# Patient Record
Sex: FEMALE | Race: WHITE | NOT HISPANIC OR LATINO | Employment: OTHER | URBAN - METROPOLITAN AREA
[De-identification: names, ages, dates, MRNs, and addresses within clinical notes are randomized per-mention and may not be internally consistent; named-entity substitution may affect disease eponyms.]

---

## 2017-08-04 PROBLEM — I20.9 ANGINA, CLASS II: Status: ACTIVE | Noted: 2017-08-04

## 2021-02-02 PROBLEM — I10 ESSENTIAL HYPERTENSION: Status: ACTIVE | Noted: 2021-02-02

## 2021-02-02 PROBLEM — Z72.0 TOBACCO ABUSE: Status: ACTIVE | Noted: 2021-02-02

## 2021-03-30 ENCOUNTER — OFFICE VISIT (OUTPATIENT)
Dept: OBSTETRICS AND GYNECOLOGY | Facility: CLINIC | Age: 46
End: 2021-03-30
Payer: MEDICAID

## 2021-03-30 ENCOUNTER — LAB VISIT (OUTPATIENT)
Dept: LAB | Facility: HOSPITAL | Age: 46
End: 2021-03-30
Attending: OBSTETRICS & GYNECOLOGY
Payer: MEDICAID

## 2021-03-30 VITALS
RESPIRATION RATE: 13 BRPM | HEART RATE: 72 BPM | DIASTOLIC BLOOD PRESSURE: 67 MMHG | BODY MASS INDEX: 35.3 KG/M2 | SYSTOLIC BLOOD PRESSURE: 102 MMHG | WEIGHT: 193 LBS

## 2021-03-30 DIAGNOSIS — N83.202 LEFT OVARIAN CYST: Primary | ICD-10-CM

## 2021-03-30 DIAGNOSIS — N83.202 LEFT OVARIAN CYST: ICD-10-CM

## 2021-03-30 PROCEDURE — 99203 PR OFFICE/OUTPT VISIT, NEW, LEVL III, 30-44 MIN: ICD-10-PCS | Mod: S$PBB,,, | Performed by: OBSTETRICS & GYNECOLOGY

## 2021-03-30 PROCEDURE — 99203 OFFICE O/P NEW LOW 30 MIN: CPT | Mod: S$PBB,,, | Performed by: OBSTETRICS & GYNECOLOGY

## 2021-03-30 PROCEDURE — 99205 OFFICE O/P NEW HI 60 MIN: CPT | Mod: PBBFAC | Performed by: OBSTETRICS & GYNECOLOGY

## 2021-03-30 PROCEDURE — 86304 IMMUNOASSAY TUMOR CA 125: CPT | Performed by: OBSTETRICS & GYNECOLOGY

## 2021-03-30 PROCEDURE — 99999 PR PBB SHADOW E&M-NEW PATIENT-LVL V: ICD-10-PCS | Mod: PBBFAC,,, | Performed by: OBSTETRICS & GYNECOLOGY

## 2021-03-30 PROCEDURE — 36415 COLL VENOUS BLD VENIPUNCTURE: CPT | Performed by: OBSTETRICS & GYNECOLOGY

## 2021-03-30 PROCEDURE — 99999 PR PBB SHADOW E&M-NEW PATIENT-LVL V: CPT | Mod: PBBFAC,,, | Performed by: OBSTETRICS & GYNECOLOGY

## 2021-03-30 RX ORDER — CIPROFLOXACIN 500 MG/1
500 TABLET ORAL
COMMUNITY
End: 2021-04-22

## 2021-03-30 RX ORDER — PRAVASTATIN SODIUM 40 MG/1
40 TABLET ORAL DAILY
COMMUNITY
End: 2022-04-13

## 2021-03-30 RX ORDER — METRONIDAZOLE 500 MG/1
500 TABLET ORAL 3 TIMES DAILY
COMMUNITY
End: 2021-04-22

## 2021-03-30 RX ORDER — CYCLOBENZAPRINE HCL 10 MG
10 TABLET ORAL 3 TIMES DAILY PRN
COMMUNITY
End: 2022-08-01

## 2021-03-31 LAB — CANCER AG125 SERPL-ACNC: 7 U/ML (ref 0–30)

## 2021-04-01 ENCOUNTER — TELEPHONE (OUTPATIENT)
Dept: OBSTETRICS AND GYNECOLOGY | Facility: CLINIC | Age: 46
End: 2021-04-01

## 2021-04-19 ENCOUNTER — ANESTHESIA EVENT (OUTPATIENT)
Dept: SURGERY | Facility: HOSPITAL | Age: 46
End: 2021-04-19
Payer: MEDICAID

## 2021-04-22 ENCOUNTER — OFFICE VISIT (OUTPATIENT)
Dept: OBSTETRICS AND GYNECOLOGY | Facility: CLINIC | Age: 46
End: 2021-04-22
Payer: MEDICAID

## 2021-04-22 ENCOUNTER — HOSPITAL ENCOUNTER (OUTPATIENT)
Dept: PREADMISSION TESTING | Facility: HOSPITAL | Age: 46
Discharge: HOME | End: 2021-04-22
Attending: OBSTETRICS & GYNECOLOGY
Payer: MEDICAID

## 2021-04-22 ENCOUNTER — HOSPITAL ENCOUNTER (OUTPATIENT)
Dept: PULMONOLOGY | Facility: HOSPITAL | Age: 46
Discharge: HOME | End: 2021-04-22
Attending: OBSTETRICS & GYNECOLOGY
Payer: MEDICAID

## 2021-04-22 ENCOUNTER — HOSPITAL ENCOUNTER (OUTPATIENT)
Dept: RADIOLOGY | Facility: HOSPITAL | Age: 46
Discharge: HOME | End: 2021-04-22
Attending: OBSTETRICS & GYNECOLOGY
Payer: MEDICAID

## 2021-04-22 VITALS
SYSTOLIC BLOOD PRESSURE: 112 MMHG | WEIGHT: 190.38 LBS | HEART RATE: 86 BPM | BODY MASS INDEX: 35.03 KG/M2 | DIASTOLIC BLOOD PRESSURE: 70 MMHG | RESPIRATION RATE: 12 BRPM | OXYGEN SATURATION: 100 % | HEIGHT: 62 IN

## 2021-04-22 DIAGNOSIS — N83.202 LEFT OVARIAN CYST: ICD-10-CM

## 2021-04-22 DIAGNOSIS — Z01.818 PREOP TESTING: ICD-10-CM

## 2021-04-22 DIAGNOSIS — N83.202 LEFT OVARIAN CYST: Primary | ICD-10-CM

## 2021-04-22 PROCEDURE — 93010 EKG 12-LEAD: ICD-10-PCS | Mod: ,,, | Performed by: INTERNAL MEDICINE

## 2021-04-22 PROCEDURE — 71045 X-RAY EXAM CHEST 1 VIEW: CPT | Mod: TC

## 2021-04-22 PROCEDURE — 71045 XR CHEST 1 VIEW PRE-OP: ICD-10-PCS | Mod: 26,,, | Performed by: RADIOLOGY

## 2021-04-22 PROCEDURE — 93005 ELECTROCARDIOGRAM TRACING: CPT

## 2021-04-22 PROCEDURE — 71045 X-RAY EXAM CHEST 1 VIEW: CPT | Mod: 26,,, | Performed by: RADIOLOGY

## 2021-04-22 PROCEDURE — 99999 PR PBB SHADOW E&M-EST. PATIENT-LVL V: CPT | Mod: PBBFAC,,, | Performed by: OBSTETRICS & GYNECOLOGY

## 2021-04-22 PROCEDURE — 99215 OFFICE O/P EST HI 40 MIN: CPT | Mod: PBBFAC,25 | Performed by: OBSTETRICS & GYNECOLOGY

## 2021-04-22 PROCEDURE — 93010 ELECTROCARDIOGRAM REPORT: CPT | Mod: ,,, | Performed by: INTERNAL MEDICINE

## 2021-04-22 PROCEDURE — 99499 NO LOS: ICD-10-PCS | Mod: S$PBB,,, | Performed by: OBSTETRICS & GYNECOLOGY

## 2021-04-22 PROCEDURE — 99499 UNLISTED E&M SERVICE: CPT | Mod: S$PBB,,, | Performed by: OBSTETRICS & GYNECOLOGY

## 2021-04-22 PROCEDURE — 99999 PR PBB SHADOW E&M-EST. PATIENT-LVL V: ICD-10-PCS | Mod: PBBFAC,,, | Performed by: OBSTETRICS & GYNECOLOGY

## 2021-04-22 RX ORDER — SODIUM CHLORIDE, SODIUM LACTATE, POTASSIUM CHLORIDE, CALCIUM CHLORIDE 600; 310; 30; 20 MG/100ML; MG/100ML; MG/100ML; MG/100ML
INJECTION, SOLUTION INTRAVENOUS CONTINUOUS
Status: CANCELLED | OUTPATIENT
Start: 2021-04-22

## 2021-04-24 ENCOUNTER — HOSPITAL ENCOUNTER (OUTPATIENT)
Dept: PREADMISSION TESTING | Facility: HOSPITAL | Age: 46
Discharge: HOME | End: 2021-04-24
Attending: OBSTETRICS & GYNECOLOGY
Payer: MEDICAID

## 2021-04-24 DIAGNOSIS — Z01.818 PRE-OP TESTING: ICD-10-CM

## 2021-04-24 LAB — SARS-COV-2 RNA RESP QL NAA+PROBE: NOT DETECTED

## 2021-04-24 PROCEDURE — U0005 INFEC AGEN DETEC AMPLI PROBE: HCPCS | Performed by: OBSTETRICS & GYNECOLOGY

## 2021-04-24 PROCEDURE — U0003 INFECTIOUS AGENT DETECTION BY NUCLEIC ACID (DNA OR RNA); SEVERE ACUTE RESPIRATORY SYNDROME CORONAVIRUS 2 (SARS-COV-2) (CORONAVIRUS DISEASE [COVID-19]), AMPLIFIED PROBE TECHNIQUE, MAKING USE OF HIGH THROUGHPUT TECHNOLOGIES AS DESCRIBED BY CMS-2020-01-R: HCPCS | Performed by: OBSTETRICS & GYNECOLOGY

## 2021-04-27 ENCOUNTER — ANESTHESIA (OUTPATIENT)
Dept: SURGERY | Facility: HOSPITAL | Age: 46
End: 2021-04-27
Payer: MEDICAID

## 2021-04-27 ENCOUNTER — HOSPITAL ENCOUNTER (OUTPATIENT)
Facility: HOSPITAL | Age: 46
Discharge: HOME | End: 2021-04-27
Attending: OBSTETRICS & GYNECOLOGY | Admitting: OBSTETRICS & GYNECOLOGY
Payer: MEDICAID

## 2021-04-27 VITALS
DIASTOLIC BLOOD PRESSURE: 65 MMHG | OXYGEN SATURATION: 100 % | SYSTOLIC BLOOD PRESSURE: 113 MMHG | TEMPERATURE: 97 F | RESPIRATION RATE: 18 BRPM | HEART RATE: 76 BPM

## 2021-04-27 DIAGNOSIS — N83.202 LEFT OVARIAN CYST: ICD-10-CM

## 2021-04-27 PROCEDURE — 37000009 HC ANESTHESIA EA ADD 15 MINS: Performed by: OBSTETRICS & GYNECOLOGY

## 2021-04-27 PROCEDURE — 27201423 OPTIME MED/SURG SUP & DEVICES STERILE SUPPLY: Performed by: OBSTETRICS & GYNECOLOGY

## 2021-04-27 PROCEDURE — 88307 TISSUE EXAM BY PATHOLOGIST: CPT | Mod: 26,,, | Performed by: PATHOLOGY

## 2021-04-27 PROCEDURE — 36000709 HC OR TIME LEV III EA ADD 15 MIN: Performed by: OBSTETRICS & GYNECOLOGY

## 2021-04-27 PROCEDURE — 88307 PR  SURG PATH,LEVEL V: ICD-10-PCS | Mod: 26,,, | Performed by: PATHOLOGY

## 2021-04-27 PROCEDURE — 71000033 HC RECOVERY, INTIAL HOUR: Performed by: OBSTETRICS & GYNECOLOGY

## 2021-04-27 PROCEDURE — 36000708 HC OR TIME LEV III 1ST 15 MIN: Performed by: OBSTETRICS & GYNECOLOGY

## 2021-04-27 PROCEDURE — 58661 PR LAP,RMV  ADNEXAL STRUCTURE: ICD-10-PCS | Mod: LT,,, | Performed by: OBSTETRICS & GYNECOLOGY

## 2021-04-27 PROCEDURE — 63600175 PHARM REV CODE 636 W HCPCS: Performed by: NURSE ANESTHETIST, CERTIFIED REGISTERED

## 2021-04-27 PROCEDURE — 88307 TISSUE EXAM BY PATHOLOGIST: CPT | Performed by: PATHOLOGY

## 2021-04-27 PROCEDURE — 00840 ANES IPER PX LOWER ABD NOS: CPT | Performed by: OBSTETRICS & GYNECOLOGY

## 2021-04-27 PROCEDURE — 25000003 PHARM REV CODE 250: Performed by: NURSE ANESTHETIST, CERTIFIED REGISTERED

## 2021-04-27 PROCEDURE — 58661 LAPAROSCOPY REMOVE ADNEXA: CPT | Mod: LT,,, | Performed by: OBSTETRICS & GYNECOLOGY

## 2021-04-27 PROCEDURE — 37000008 HC ANESTHESIA 1ST 15 MINUTES: Performed by: OBSTETRICS & GYNECOLOGY

## 2021-04-27 PROCEDURE — 58661 LAPAROSCOPY REMOVE ADNEXA: CPT | Mod: 80,LT,, | Performed by: OBSTETRICS & GYNECOLOGY

## 2021-04-27 PROCEDURE — 63600175 PHARM REV CODE 636 W HCPCS: Performed by: OBSTETRICS & GYNECOLOGY

## 2021-04-27 PROCEDURE — 58661 PR LAP,RMV  ADNEXAL STRUCTURE: ICD-10-PCS | Mod: 80,LT,, | Performed by: OBSTETRICS & GYNECOLOGY

## 2021-04-27 PROCEDURE — 00840 ANES IPER PX LOWER ABD NOS: CPT | Mod: QZ | Performed by: NURSE ANESTHETIST, CERTIFIED REGISTERED

## 2021-04-27 RX ORDER — SODIUM CHLORIDE 0.9 % (FLUSH) 0.9 %
3 SYRINGE (ML) INJECTION
Status: DISCONTINUED | OUTPATIENT
Start: 2021-04-27 | End: 2021-04-27 | Stop reason: HOSPADM

## 2021-04-27 RX ORDER — ACETAMINOPHEN 10 MG/ML
INJECTION, SOLUTION INTRAVENOUS
Status: DISCONTINUED | OUTPATIENT
Start: 2021-04-27 | End: 2021-04-27

## 2021-04-27 RX ORDER — ONDANSETRON HYDROCHLORIDE 2 MG/ML
INJECTION, SOLUTION INTRAMUSCULAR; INTRAVENOUS
Status: DISCONTINUED | OUTPATIENT
Start: 2021-04-27 | End: 2021-04-27

## 2021-04-27 RX ORDER — SODIUM CHLORIDE, SODIUM LACTATE, POTASSIUM CHLORIDE, CALCIUM CHLORIDE 600; 310; 30; 20 MG/100ML; MG/100ML; MG/100ML; MG/100ML
INJECTION, SOLUTION INTRAVENOUS CONTINUOUS
Status: DISCONTINUED | OUTPATIENT
Start: 2021-04-27 | End: 2021-04-27 | Stop reason: HOSPADM

## 2021-04-27 RX ORDER — MIDAZOLAM HYDROCHLORIDE 1 MG/ML
INJECTION INTRAMUSCULAR; INTRAVENOUS
Status: DISCONTINUED | OUTPATIENT
Start: 2021-04-27 | End: 2021-04-27

## 2021-04-27 RX ORDER — FENTANYL CITRATE 50 UG/ML
INJECTION, SOLUTION INTRAMUSCULAR; INTRAVENOUS
Status: DISCONTINUED | OUTPATIENT
Start: 2021-04-27 | End: 2021-04-27

## 2021-04-27 RX ORDER — KETOROLAC TROMETHAMINE 30 MG/ML
INJECTION, SOLUTION INTRAMUSCULAR; INTRAVENOUS
Status: DISCONTINUED | OUTPATIENT
Start: 2021-04-27 | End: 2021-04-27

## 2021-04-27 RX ORDER — NEOSTIGMINE METHYLSULFATE 5 MG/5 ML
SYRINGE (ML) INTRAVENOUS
Status: DISCONTINUED | OUTPATIENT
Start: 2021-04-27 | End: 2021-04-27

## 2021-04-27 RX ORDER — ROCURONIUM BROMIDE 10 MG/ML
INJECTION, SOLUTION INTRAVENOUS
Status: DISCONTINUED | OUTPATIENT
Start: 2021-04-27 | End: 2021-04-27

## 2021-04-27 RX ORDER — ETOMIDATE 2 MG/ML
INJECTION INTRAVENOUS
Status: DISCONTINUED | OUTPATIENT
Start: 2021-04-27 | End: 2021-04-27

## 2021-04-27 RX ORDER — OXYCODONE AND ACETAMINOPHEN 5; 325 MG/1; MG/1
1 TABLET ORAL EVERY 4 HOURS PRN
Qty: 20 TABLET | Refills: 0 | Status: SHIPPED | OUTPATIENT
Start: 2021-04-27 | End: 2021-05-11

## 2021-04-27 RX ORDER — DIPHENHYDRAMINE HYDROCHLORIDE 50 MG/ML
25 INJECTION INTRAMUSCULAR; INTRAVENOUS EVERY 6 HOURS PRN
Status: CANCELLED | OUTPATIENT
Start: 2021-04-27

## 2021-04-27 RX ORDER — SODIUM CHLORIDE 0.9 % (FLUSH) 0.9 %
3 SYRINGE (ML) INJECTION EVERY 8 HOURS
Status: DISCONTINUED | OUTPATIENT
Start: 2021-04-27 | End: 2021-04-27 | Stop reason: HOSPADM

## 2021-04-27 RX ORDER — KETOROLAC TROMETHAMINE 15 MG/ML
15 INJECTION, SOLUTION INTRAMUSCULAR; INTRAVENOUS EVERY 8 HOURS PRN
Status: DISCONTINUED | OUTPATIENT
Start: 2021-04-28 | End: 2021-04-27 | Stop reason: HOSPADM

## 2021-04-27 RX ORDER — LIDOCAINE HYDROCHLORIDE 20 MG/ML
INJECTION, SOLUTION EPIDURAL; INFILTRATION; INTRACAUDAL; PERINEURAL
Status: DISCONTINUED | OUTPATIENT
Start: 2021-04-27 | End: 2021-04-27

## 2021-04-27 RX ORDER — OXYCODONE AND ACETAMINOPHEN 5; 325 MG/1; MG/1
1 TABLET ORAL EVERY 4 HOURS PRN
Status: DISCONTINUED | OUTPATIENT
Start: 2021-04-27 | End: 2021-04-27 | Stop reason: HOSPADM

## 2021-04-27 RX ORDER — ONDANSETRON 2 MG/ML
4 INJECTION INTRAMUSCULAR; INTRAVENOUS DAILY PRN
Status: DISCONTINUED | OUTPATIENT
Start: 2021-04-27 | End: 2021-04-27 | Stop reason: HOSPADM

## 2021-04-27 RX ORDER — PROPOFOL 10 MG/ML
VIAL (ML) INTRAVENOUS
Status: DISCONTINUED | OUTPATIENT
Start: 2021-04-27 | End: 2021-04-27

## 2021-04-27 RX ADMIN — PROPOFOL 100 MG: 10 INJECTION, EMULSION INTRAVENOUS at 03:04

## 2021-04-27 RX ADMIN — GLYCOPYRROLATE 0.4 MG: 0.2 INJECTION, SOLUTION INTRAMUSCULAR; INTRAVENOUS at 04:04

## 2021-04-27 RX ADMIN — FENTANYL CITRATE 50 MCG: 50 INJECTION, SOLUTION INTRAMUSCULAR; INTRAVENOUS at 03:04

## 2021-04-27 RX ADMIN — FENTANYL CITRATE 100 MCG: 50 INJECTION, SOLUTION INTRAMUSCULAR; INTRAVENOUS at 03:04

## 2021-04-27 RX ADMIN — MIDAZOLAM HYDROCHLORIDE 2 MG: 1 INJECTION, SOLUTION INTRAMUSCULAR; INTRAVENOUS at 03:04

## 2021-04-27 RX ADMIN — LIDOCAINE HYDROCHLORIDE 80 MG: 20 INJECTION, SOLUTION EPIDURAL; INFILTRATION; INTRACAUDAL; PERINEURAL at 03:04

## 2021-04-27 RX ADMIN — ACETAMINOPHEN 1000 MG: 10 INJECTION, SOLUTION INTRAVENOUS at 03:04

## 2021-04-27 RX ADMIN — SODIUM CHLORIDE, SODIUM LACTATE, POTASSIUM CHLORIDE, AND CALCIUM CHLORIDE: .6; .31; .03; .02 INJECTION, SOLUTION INTRAVENOUS at 03:04

## 2021-04-27 RX ADMIN — ROCURONIUM BROMIDE 30 MG: 10 INJECTION, SOLUTION INTRAVENOUS at 03:04

## 2021-04-27 RX ADMIN — FENTANYL CITRATE 50 MCG: 50 INJECTION, SOLUTION INTRAMUSCULAR; INTRAVENOUS at 04:04

## 2021-04-27 RX ADMIN — KETOROLAC TROMETHAMINE 30 MG: 30 INJECTION, SOLUTION INTRAMUSCULAR; INTRAVENOUS at 04:04

## 2021-04-27 RX ADMIN — ONDANSETRON 8 MG: 2 INJECTION, SOLUTION INTRAMUSCULAR; INTRAVENOUS at 03:04

## 2021-04-27 RX ADMIN — SODIUM CHLORIDE: 0.9 INJECTION, SOLUTION INTRAVENOUS at 03:04

## 2021-04-27 RX ADMIN — Medication 3 MG: at 04:04

## 2021-04-27 RX ADMIN — ETOMIDATE 8 MCG: 2 INJECTION INTRAVENOUS at 03:04

## 2021-04-28 ENCOUNTER — TELEPHONE (OUTPATIENT)
Dept: OBSTETRICS AND GYNECOLOGY | Facility: CLINIC | Age: 46
End: 2021-04-28

## 2021-05-04 LAB
FINAL PATHOLOGIC DIAGNOSIS: NORMAL
GROSS: NORMAL
Lab: NORMAL

## 2021-05-06 ENCOUNTER — PATIENT MESSAGE (OUTPATIENT)
Dept: RESEARCH | Facility: HOSPITAL | Age: 46
End: 2021-05-06

## 2021-05-10 ENCOUNTER — PATIENT MESSAGE (OUTPATIENT)
Dept: RESEARCH | Facility: HOSPITAL | Age: 46
End: 2021-05-10

## 2021-05-11 ENCOUNTER — TELEPHONE (OUTPATIENT)
Dept: OBSTETRICS AND GYNECOLOGY | Facility: CLINIC | Age: 46
End: 2021-05-11

## 2021-05-11 ENCOUNTER — OFFICE VISIT (OUTPATIENT)
Dept: OBSTETRICS AND GYNECOLOGY | Facility: CLINIC | Age: 46
End: 2021-05-11
Payer: MEDICAID

## 2021-05-11 VITALS
SYSTOLIC BLOOD PRESSURE: 112 MMHG | HEART RATE: 93 BPM | BODY MASS INDEX: 34.75 KG/M2 | DIASTOLIC BLOOD PRESSURE: 68 MMHG | WEIGHT: 190 LBS

## 2021-05-11 DIAGNOSIS — Z09 POSTOPERATIVE FOLLOW-UP: Primary | ICD-10-CM

## 2021-05-11 PROCEDURE — 99999 PR PBB SHADOW E&M-EST. PATIENT-LVL IV: ICD-10-PCS | Mod: PBBFAC,,, | Performed by: OBSTETRICS & GYNECOLOGY

## 2021-05-11 PROCEDURE — 99214 OFFICE O/P EST MOD 30 MIN: CPT | Mod: PBBFAC | Performed by: OBSTETRICS & GYNECOLOGY

## 2021-05-11 PROCEDURE — 99024 PR POST-OP FOLLOW-UP VISIT: ICD-10-PCS | Mod: ,,, | Performed by: OBSTETRICS & GYNECOLOGY

## 2021-05-11 PROCEDURE — 99024 POSTOP FOLLOW-UP VISIT: CPT | Mod: ,,, | Performed by: OBSTETRICS & GYNECOLOGY

## 2021-05-11 PROCEDURE — 99999 PR PBB SHADOW E&M-EST. PATIENT-LVL IV: CPT | Mod: PBBFAC,,, | Performed by: OBSTETRICS & GYNECOLOGY

## 2021-05-25 ENCOUNTER — OFFICE VISIT (OUTPATIENT)
Dept: OBSTETRICS AND GYNECOLOGY | Facility: CLINIC | Age: 46
End: 2021-05-25
Payer: MEDICAID

## 2021-05-25 VITALS
DIASTOLIC BLOOD PRESSURE: 68 MMHG | HEART RATE: 72 BPM | BODY MASS INDEX: 34.57 KG/M2 | SYSTOLIC BLOOD PRESSURE: 122 MMHG | WEIGHT: 189 LBS

## 2021-05-25 DIAGNOSIS — Z09 POSTOPERATIVE FOLLOW-UP: Primary | ICD-10-CM

## 2021-05-25 PROCEDURE — 99999 PR PBB SHADOW E&M-EST. PATIENT-LVL III: ICD-10-PCS | Mod: PBBFAC,,, | Performed by: OBSTETRICS & GYNECOLOGY

## 2021-05-25 PROCEDURE — 99024 POSTOP FOLLOW-UP VISIT: CPT | Mod: ,,, | Performed by: OBSTETRICS & GYNECOLOGY

## 2021-05-25 PROCEDURE — 99213 OFFICE O/P EST LOW 20 MIN: CPT | Mod: PBBFAC | Performed by: OBSTETRICS & GYNECOLOGY

## 2021-05-25 PROCEDURE — 99999 PR PBB SHADOW E&M-EST. PATIENT-LVL III: CPT | Mod: PBBFAC,,, | Performed by: OBSTETRICS & GYNECOLOGY

## 2021-05-25 PROCEDURE — 99024 PR POST-OP FOLLOW-UP VISIT: ICD-10-PCS | Mod: ,,, | Performed by: OBSTETRICS & GYNECOLOGY

## 2021-12-08 PROBLEM — E55.9 VITAMIN D INSUFFICIENCY: Status: ACTIVE | Noted: 2021-12-08

## 2022-05-18 ENCOUNTER — TELEPHONE (OUTPATIENT)
Dept: NEUROLOGY | Facility: CLINIC | Age: 47
End: 2022-05-18

## 2022-05-18 NOTE — TELEPHONE ENCOUNTER
----- Message from Ana Lei sent at 5/18/2022 10:30 AM CDT -----  Who Called: Davina Paez from Dr. Barragan's Office in [facility]    What is the reqeust in detail: Requesting call back to schedule NP appointment from referral to be seen for complicated diagnosis with complicated migraines. Referral was sent 03/03/2022 and will be refaxed today.    Can the clinic reply by [facility]? No    Best Call Back Number: [phone]    Additional Information:

## 2022-08-01 ENCOUNTER — OFFICE VISIT (OUTPATIENT)
Dept: OBSTETRICS AND GYNECOLOGY | Facility: CLINIC | Age: 47
End: 2022-08-01
Payer: MEDICAID

## 2022-08-01 VITALS
BODY MASS INDEX: 32.11 KG/M2 | RESPIRATION RATE: 16 BRPM | HEART RATE: 104 BPM | DIASTOLIC BLOOD PRESSURE: 76 MMHG | HEIGHT: 62 IN | SYSTOLIC BLOOD PRESSURE: 118 MMHG | WEIGHT: 174.5 LBS

## 2022-08-01 DIAGNOSIS — Z12.31 ENCOUNTER FOR SCREENING MAMMOGRAM FOR MALIGNANT NEOPLASM OF BREAST: ICD-10-CM

## 2022-08-01 DIAGNOSIS — N76.4 CARBUNCLE, VULVA: Primary | ICD-10-CM

## 2022-08-01 PROCEDURE — 3078F PR MOST RECENT DIASTOLIC BLOOD PRESSURE < 80 MM HG: ICD-10-PCS | Mod: CPTII,,, | Performed by: OBSTETRICS & GYNECOLOGY

## 2022-08-01 PROCEDURE — 99214 OFFICE O/P EST MOD 30 MIN: CPT | Mod: PBBFAC | Performed by: OBSTETRICS & GYNECOLOGY

## 2022-08-01 PROCEDURE — 1160F RVW MEDS BY RX/DR IN RCRD: CPT | Mod: CPTII,,, | Performed by: OBSTETRICS & GYNECOLOGY

## 2022-08-01 PROCEDURE — 4010F PR ACE/ARB THEARPY RXD/TAKEN: ICD-10-PCS | Mod: CPTII,,, | Performed by: OBSTETRICS & GYNECOLOGY

## 2022-08-01 PROCEDURE — 99213 PR OFFICE/OUTPT VISIT, EST, LEVL III, 20-29 MIN: ICD-10-PCS | Mod: S$PBB,,, | Performed by: OBSTETRICS & GYNECOLOGY

## 2022-08-01 PROCEDURE — 3044F HG A1C LEVEL LT 7.0%: CPT | Mod: CPTII,,, | Performed by: OBSTETRICS & GYNECOLOGY

## 2022-08-01 PROCEDURE — 99999 PR PBB SHADOW E&M-EST. PATIENT-LVL IV: CPT | Mod: PBBFAC,,, | Performed by: OBSTETRICS & GYNECOLOGY

## 2022-08-01 PROCEDURE — 1159F PR MEDICATION LIST DOCUMENTED IN MEDICAL RECORD: ICD-10-PCS | Mod: CPTII,,, | Performed by: OBSTETRICS & GYNECOLOGY

## 2022-08-01 PROCEDURE — 3078F DIAST BP <80 MM HG: CPT | Mod: CPTII,,, | Performed by: OBSTETRICS & GYNECOLOGY

## 2022-08-01 PROCEDURE — 3008F PR BODY MASS INDEX (BMI) DOCUMENTED: ICD-10-PCS | Mod: CPTII,,, | Performed by: OBSTETRICS & GYNECOLOGY

## 2022-08-01 PROCEDURE — 1160F PR REVIEW ALL MEDS BY PRESCRIBER/CLIN PHARMACIST DOCUMENTED: ICD-10-PCS | Mod: CPTII,,, | Performed by: OBSTETRICS & GYNECOLOGY

## 2022-08-01 PROCEDURE — 99999 PR PBB SHADOW E&M-EST. PATIENT-LVL IV: ICD-10-PCS | Mod: PBBFAC,,, | Performed by: OBSTETRICS & GYNECOLOGY

## 2022-08-01 PROCEDURE — 3074F SYST BP LT 130 MM HG: CPT | Mod: CPTII,,, | Performed by: OBSTETRICS & GYNECOLOGY

## 2022-08-01 PROCEDURE — 3044F PR MOST RECENT HEMOGLOBIN A1C LEVEL <7.0%: ICD-10-PCS | Mod: CPTII,,, | Performed by: OBSTETRICS & GYNECOLOGY

## 2022-08-01 PROCEDURE — 99213 OFFICE O/P EST LOW 20 MIN: CPT | Mod: S$PBB,,, | Performed by: OBSTETRICS & GYNECOLOGY

## 2022-08-01 PROCEDURE — 3074F PR MOST RECENT SYSTOLIC BLOOD PRESSURE < 130 MM HG: ICD-10-PCS | Mod: CPTII,,, | Performed by: OBSTETRICS & GYNECOLOGY

## 2022-08-01 PROCEDURE — 3008F BODY MASS INDEX DOCD: CPT | Mod: CPTII,,, | Performed by: OBSTETRICS & GYNECOLOGY

## 2022-08-01 PROCEDURE — 4010F ACE/ARB THERAPY RXD/TAKEN: CPT | Mod: CPTII,,, | Performed by: OBSTETRICS & GYNECOLOGY

## 2022-08-01 PROCEDURE — 1159F MED LIST DOCD IN RCRD: CPT | Mod: CPTII,,, | Performed by: OBSTETRICS & GYNECOLOGY

## 2022-08-01 RX ORDER — UBROGEPANT 100 MG/1
TABLET ORAL
COMMUNITY
End: 2022-08-11

## 2022-08-01 RX ORDER — MEDROXYPROGESTERONE ACETATE 104 MG/.65ML
INJECTION, SUSPENSION SUBCUTANEOUS
COMMUNITY
Start: 2022-06-29

## 2022-08-01 RX ORDER — CELECOXIB 100 MG/1
CAPSULE ORAL
COMMUNITY
End: 2022-10-13

## 2022-08-01 RX ORDER — CEPHALEXIN 250 MG/1
250 CAPSULE ORAL EVERY 8 HOURS
Qty: 21 CAPSULE | Refills: 0 | Status: SHIPPED | OUTPATIENT
Start: 2022-08-01 | End: 2022-10-13

## 2022-08-01 RX ORDER — PEN NEEDLE, DIABETIC 31 GX5/16"
NEEDLE, DISPOSABLE MISCELLANEOUS DAILY
COMMUNITY
Start: 2022-06-02 | End: 2023-05-25 | Stop reason: SDUPTHER

## 2022-08-01 NOTE — PROGRESS NOTES
Subjective:       Patient ID: Mili Marsh is a 47 y.o. female.    Chief Complaint:  Follow-up (From sx in )      History of Present Illness  Patient presents complaining a bump vaginally.  Patient states she gets this sometimes in usually has to have a course of antibiotics.  Patient also states she never followed up from her surgery in April of last year.  Patient had a removal left tube and ovary.  Pathology was benign.  Patient also states she is overdue for mammogram.    Menstrual History:  OB History        0    Para   0    Term   0       0    AB   0    Living   0       SAB   0    IAB   0    Ectopic   0    Multiple   0    Live Births                    Menarche age:  No LMP recorded. (Menstrual status: Other).         Review of Systems  Review of Systems   Constitutional: Negative for activity change, appetite change, chills, diaphoresis, fatigue, fever and unexpected weight change.   HENT: Negative for congestion, dental problem, drooling, ear discharge, ear pain, facial swelling, hearing loss, mouth sores, nosebleeds, postnasal drip, rhinorrhea, sinus pressure, sneezing, sore throat, tinnitus, trouble swallowing and voice change.    Eyes: Negative for photophobia, pain, discharge, redness, itching and visual disturbance.   Respiratory: Negative for apnea, cough, choking, chest tightness, shortness of breath, wheezing and stridor.    Cardiovascular: Negative for chest pain, palpitations and leg swelling.   Gastrointestinal: Negative for abdominal distention, abdominal pain, anal bleeding, blood in stool, constipation, diarrhea, nausea, rectal pain and vomiting.   Endocrine: Negative for cold intolerance, heat intolerance, polydipsia, polyphagia and polyuria.   Genitourinary: Negative for decreased urine volume, difficulty urinating, dyspareunia, dysuria, enuresis, flank pain, frequency, genital sores, hematuria, menstrual problem, pelvic pain, urgency, vaginal bleeding, vaginal  discharge and vaginal pain.   Musculoskeletal: Negative for arthralgias, back pain, gait problem, joint swelling, myalgias, neck pain and neck stiffness.   Skin: Negative for color change, pallor, rash and wound.   Allergic/Immunologic: Negative for environmental allergies, food allergies and immunocompromised state.   Neurological: Negative for dizziness, tremors, seizures, syncope, facial asymmetry, speech difficulty, weakness, light-headedness, numbness and headaches.   Hematological: Negative for adenopathy. Does not bruise/bleed easily.   Psychiatric/Behavioral: Negative for agitation, behavioral problems, confusion, decreased concentration, dysphoric mood, hallucinations, self-injury, sleep disturbance and suicidal ideas. The patient is not nervous/anxious and is not hyperactive.            Objective:      Physical Exam  Vitals and nursing note reviewed. Exam conducted with a chaperone present.   Abdominal:      Hernia: There is no hernia in the left inguinal area or right inguinal area.   Genitourinary:     General: Normal vulva.      Labia:         Right: No rash, tenderness, lesion or injury.         Left: No rash, tenderness, lesion or injury.       Urethra: No prolapse, urethral pain, urethral swelling or urethral lesion.      Vagina: No signs of injury and foreign body. No vaginal discharge, erythema, tenderness or bleeding.      Cervix: No cervical motion tenderness, discharge or friability.      Uterus: Not deviated, not enlarged, not fixed and not tender.       Adnexa:         Right: No mass, tenderness or fullness.          Left: No mass, tenderness or fullness.        Rectum: No external hemorrhoid.       Lymphadenopathy:      Lower Body: No right inguinal adenopathy. No left inguinal adenopathy.             Assessment:        1. Carbuncle, vulva    2. Encounter for screening mammogram for malignant neoplasm of breast                Plan:         Mili was seen today for follow-up.    Diagnoses and  all orders for this visit:    Carbuncle, vulva    Encounter for screening mammogram for malignant neoplasm of breast  -     Mammo Digital Screening Bilat w/ Otto; Future    Other orders  -     cephALEXin (KEFLEX) 250 MG capsule; Take 1 capsule (250 mg total) by mouth every 8 (eight) hours.

## 2022-08-11 ENCOUNTER — OFFICE VISIT (OUTPATIENT)
Dept: NEUROLOGY | Facility: CLINIC | Age: 47
End: 2022-08-11
Payer: MEDICAID

## 2022-08-11 VITALS
SYSTOLIC BLOOD PRESSURE: 121 MMHG | HEART RATE: 105 BPM | BODY MASS INDEX: 32.82 KG/M2 | HEIGHT: 62 IN | WEIGHT: 178.38 LBS | DIASTOLIC BLOOD PRESSURE: 81 MMHG

## 2022-08-11 DIAGNOSIS — F41.9 ANXIETY: ICD-10-CM

## 2022-08-11 DIAGNOSIS — G44.86 CERVICOGENIC HEADACHE: ICD-10-CM

## 2022-08-11 DIAGNOSIS — G43.109 MIGRAINE WITH AURA AND WITHOUT STATUS MIGRAINOSUS, NOT INTRACTABLE: Primary | ICD-10-CM

## 2022-08-11 PROCEDURE — 3079F PR MOST RECENT DIASTOLIC BLOOD PRESSURE 80-89 MM HG: ICD-10-PCS | Mod: CPTII,,, | Performed by: PHYSICIAN ASSISTANT

## 2022-08-11 PROCEDURE — 99999 PR PBB SHADOW E&M-EST. PATIENT-LVL IV: ICD-10-PCS | Mod: PBBFAC,,, | Performed by: PHYSICIAN ASSISTANT

## 2022-08-11 PROCEDURE — 3074F PR MOST RECENT SYSTOLIC BLOOD PRESSURE < 130 MM HG: ICD-10-PCS | Mod: CPTII,,, | Performed by: PHYSICIAN ASSISTANT

## 2022-08-11 PROCEDURE — 3008F BODY MASS INDEX DOCD: CPT | Mod: CPTII,,, | Performed by: PHYSICIAN ASSISTANT

## 2022-08-11 PROCEDURE — 99214 OFFICE O/P EST MOD 30 MIN: CPT | Mod: PBBFAC | Performed by: PHYSICIAN ASSISTANT

## 2022-08-11 PROCEDURE — 99204 OFFICE O/P NEW MOD 45 MIN: CPT | Mod: S$PBB,,, | Performed by: PHYSICIAN ASSISTANT

## 2022-08-11 PROCEDURE — 99204 PR OFFICE/OUTPT VISIT, NEW, LEVL IV, 45-59 MIN: ICD-10-PCS | Mod: S$PBB,,, | Performed by: PHYSICIAN ASSISTANT

## 2022-08-11 PROCEDURE — 1160F PR REVIEW ALL MEDS BY PRESCRIBER/CLIN PHARMACIST DOCUMENTED: ICD-10-PCS | Mod: CPTII,,, | Performed by: PHYSICIAN ASSISTANT

## 2022-08-11 PROCEDURE — 1160F RVW MEDS BY RX/DR IN RCRD: CPT | Mod: CPTII,,, | Performed by: PHYSICIAN ASSISTANT

## 2022-08-11 PROCEDURE — 3044F HG A1C LEVEL LT 7.0%: CPT | Mod: CPTII,,, | Performed by: PHYSICIAN ASSISTANT

## 2022-08-11 PROCEDURE — 3044F PR MOST RECENT HEMOGLOBIN A1C LEVEL <7.0%: ICD-10-PCS | Mod: CPTII,,, | Performed by: PHYSICIAN ASSISTANT

## 2022-08-11 PROCEDURE — 4010F ACE/ARB THERAPY RXD/TAKEN: CPT | Mod: CPTII,,, | Performed by: PHYSICIAN ASSISTANT

## 2022-08-11 PROCEDURE — 3074F SYST BP LT 130 MM HG: CPT | Mod: CPTII,,, | Performed by: PHYSICIAN ASSISTANT

## 2022-08-11 PROCEDURE — 1159F PR MEDICATION LIST DOCUMENTED IN MEDICAL RECORD: ICD-10-PCS | Mod: CPTII,,, | Performed by: PHYSICIAN ASSISTANT

## 2022-08-11 PROCEDURE — 4010F PR ACE/ARB THEARPY RXD/TAKEN: ICD-10-PCS | Mod: CPTII,,, | Performed by: PHYSICIAN ASSISTANT

## 2022-08-11 PROCEDURE — 3008F PR BODY MASS INDEX (BMI) DOCUMENTED: ICD-10-PCS | Mod: CPTII,,, | Performed by: PHYSICIAN ASSISTANT

## 2022-08-11 PROCEDURE — 1159F MED LIST DOCD IN RCRD: CPT | Mod: CPTII,,, | Performed by: PHYSICIAN ASSISTANT

## 2022-08-11 PROCEDURE — 99999 PR PBB SHADOW E&M-EST. PATIENT-LVL IV: CPT | Mod: PBBFAC,,, | Performed by: PHYSICIAN ASSISTANT

## 2022-08-11 PROCEDURE — 3079F DIAST BP 80-89 MM HG: CPT | Mod: CPTII,,, | Performed by: PHYSICIAN ASSISTANT

## 2022-08-11 RX ORDER — UBROGEPANT 50 MG/1
TABLET ORAL
Qty: 10 TABLET | Refills: 2 | Status: SHIPPED | OUTPATIENT
Start: 2022-08-11 | End: 2023-05-30

## 2022-08-11 NOTE — PROGRESS NOTES
"Subjective:         Patient ID: Mili Marsh is a 47 y.o. female.  Referred by:   Ilda Nguyen Md  [address] [address] [place] [address]  Chief Complaint:  Consult and Headache      History of Present Illness  This is a 47 y.o. female  who presents to clinic with her  for evaluation of headaches.   Patient notes she has suffered from migraine headaches and tension type headaches since she was in her early 20's. She further notes in 2010 she was diagnosed with "stroke-like/hemiplegic migraines" which she describes these headaches are prcoeeeded by an aura of nausea, and then the the left side of her face pulls to left (same side as headache) and sometimes is associated with left sided facial/lip numbness and these symptoms are then followed by a severe headache pain that occurs in back of her head and left parietal region and describes it feels like she can feel the  Blood in her head She notes she had previously seen neurology PA Mariann Galindo last visit in 2016 and has tried and failed many medications aimed at treating her migraine headaches and notes she has had multiple allergic reactions and is quite sensitive to medications. She notes topamax- caused her to lose her memory and speech issues, pamelor caused hallucination and most recently tried medication, venlafaxine, which made her anxiety symptoms much worse and she felt heart palps.  She notes her migraines were previously managed by her PCP and controlled with xanax and compazine as needed but she futher notes she recently changed to a different PCP who will only prescribe her 90 tablets of xanax.  and notes this is the only treatment that helped relief her migraine and hemiplegic migraine aura symptoms. She also describes she experiences tension type headaches that occur in her neck bilateraly and radiate toward her the backs of both of her ears. She also notes in march of last year she was diagnosed with tennis elbow " in her right arm and she tried wearing a brace and notes this made her right arm pain even worse and pain traveled into right shoulder/ into the right side of her neck and made both of her headaches worse.  Headache history:   Location - she describes her headaches occur in back of her head and left parietal region and describes it feels like she can feel the  Blood in her head. She also describes she experiences tension type ehadaches that occur in her neck bilateraly and radiate toward her the backs of both of her ears.   Duration - wakes up in the morning and will last all day.   Range of intensity - 10 /10  Frequency -  Almost daily   Time of day of most headaches- anytime  Prodrome/Aura - yes sometimes will experience nausea, and numbness of her lips and the left side of her face will pull and she will begin to   Triggers - none identified  Aggravating/Alleviating Factors - xanax and compazine in combination and notes this is the only treatment that helped relief her migraine and hemiplegic migraine aura symptoms  Sleep - no resolution of headache with sleep    Family history of headaches- yes her sister and her dad experience migraine headaches but neither of them expierence hemiplegic migraines       Medications tried and failed: Pamelor- allergy, Topamax- side effects, venlafaxine, Zofran, metoprolol, prochlorperazine., sumatriptan,      Past Medical History:   Diagnosis Date    Anxiety     Asthma     Bronchitis     Diabetes mellitus     History of peptic ulcer disease     Hx of staphylococcal infection     Hypercholesteremia     Hypertension     Migraine headache     Pneumonia     Thyroid disease        Past Surgical History:   Procedure Laterality Date    LAPAROSCOPIC SALPINGO-OOPHORECTOMY Left 4/27/2021    Procedure: SALPINGO-OOPHORECTOMY, LAPAROSCOPIC;  Surgeon: Elder Wagner MD;  [facility];  Service: OB/GYN;  Laterality: Left;    NO PAST SURGERIES      OOPHORECTOMY  04/2021     L ooph       Family History   Problem Relation Age of Onset    Heart failure Mother     Diabetes Mother     Hypertension Mother     Hypertension Father     Cardiomyopathy Father     Breast cancer Maternal Aunt     Breast cancer Cousin     Colon cancer Neg Hx     Ovarian cancer Neg Hx        Social History     Socioeconomic History    Marital status:     Years of education: 12   Occupational History    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[employer]   Tobacco Use    Smoking status: Current Every Day Smoker     Packs/day: 1.00     Years: 30.00     Pack years: 30.00     Types: Cigarettes    Smokeless tobacco: Never Used   Substance and Sexual Activity    Alcohol use: No     Alcohol/week: 0.0 standard drinks    Drug use: No    Sexual activity: Not Currently     Partners: Male     Birth control/protection: None     Comment:        Review of Systems: as per HPI, otherwise a balanced 10 systems review is negative  Objective:     Vitals:    08/11/22 1311   BP: 121/81   Pulse: 105       Physical Exam   Constitutional: she appears well-developed and well-nourished. she is well groomed. NAD  HENT:    Head: Normocephalic and atraumatic, Frontalis was NTTP, temporalis was NTTP   Eyes: Conjunctivae and EOM are normal. Pupils are equal, round, and reactive to light   Neck: Neck supple. Tenderness to palpation bilateral cervical paraspinal musculature, suboccipital musculature, negative tinel sign of greater and lesser occipital nerves bilaterally   Musculoskeletal: Normal range of motion. No joint stiffness. No vertebral point tenderness.  Skin: Skin is warm and dry.  Psychiatric: Normal mood and affect.     Neuro exam:    Mental status:  The patient is alert and oriented to person, place and time.  Language is intact and fluent  Remote and recent memory are intact  Normal attention and concentration  Mood is stable    Cranial Nerves:  Pupils are equal and reactive to light.    Extraocular movements are intact and  without nystagmus.    Visual fields are full to confrontation testing.   Facial movement is symmetric.  Facial sensation is intact.    Hearing is intact   Reduced neck ROM throughout especially in neck extension due to pain   Shoulder shrug symmetrical.    Coordination:     Finger to nose - normal and symmetric bilaterally     Motor:  Normal muscle bulk and symmetry. No fasciculations were noted.   Tremor not apparent   Pronator drift not apparent.    strength was strong and symmetric  Finger extension strength was strong and symmetric  RUE:appropriate against gravity and medium force as tested 5/5  LUE: appropriate against gravity and medium force as tested 5/5  RLE:appropriate against gravity and medium force as tested 5/5              LLE: appropriate against gravity and medium force as tested 5/5    Reflexes:  Right Brachioradialis 2+  Left Brachioradialis 2+  Right Biceps 2+  Left Biceps 2+  Right Patellar2+  Left Patellar 2+  Right Achilles 2+  Left Achilles 2+                          Cano was negative    Sensory:  RUE  intact light touch  LUE intact light touch  RLE intact light touch  LLE intact light touch    Gait:   Romberg - negative  Normal gait  Tandem, Heel, and Toe Walk - able to perform without difficulty        Review of Data:   No visits with results within 3 Month(s) from this visit.   Latest known visit with results is:   Lab Visit on 04/07/2022   Component Date Value Ref Range Status    WBC 04/07/2022 16.09 (A) 3.90 - 12.70 K/uL Final    RBC 04/07/2022 4.73  4.00 - 5.40 M/uL Final    Hemoglobin 04/07/2022 14.0  12.0 - 16.0 g/dL Final    Hematocrit 04/07/2022 43.4  37.0 - 48.5 % Final    MCV 04/07/2022 92  82 - 98 fL Final    MCH 04/07/2022 29.6  27.0 - 31.0 pg Final    MCHC 04/07/2022 32.3  32.0 - 36.0 g/dL Final    RDW 04/07/2022 12.4  11.5 - 14.5 % Final    Platelets 04/07/2022 290  150 - 450 K/uL Final    MPV 04/07/2022 10.2  9.2 - 12.9 fL Final    Immature Granulocytes  04/07/2022 0.6 (A) 0.0 - 0.5 % Final    Gran # (ANC) 04/07/2022 12.4 (A) 1.8 - 7.7 K/uL Final    Immature Grans (Abs) 04/07/2022 0.10 (A) 0.00 - 0.04 K/uL Final    Lymph # 04/07/2022 2.6  1.0 - 4.8 K/uL Final    Mono # 04/07/2022 0.8  0.3 - 1.0 K/uL Final    Eos # 04/07/2022 0.2  0.0 - 0.5 K/uL Final    Baso # 04/07/2022 0.07  0.00 - 0.20 K/uL Final    nRBC 04/07/2022 0  0 /100 WBC Final    Gran % 04/07/2022 77.2 (A) 38.0 - 73.0 % Final    Lymph % 04/07/2022 15.8 (A) 18.0 - 48.0 % Final    Mono % 04/07/2022 4.8  4.0 - 15.0 % Final    Eosinophil % 04/07/2022 1.2  0.0 - 8.0 % Final    Basophil % 04/07/2022 0.4  0.0 - 1.9 % Final    Differential Method 04/07/2022 Automated   Final    Sodium 04/07/2022 140  136 - 145 mmol/L Final    Potassium 04/07/2022 4.0  3.5 - 5.1 mmol/L Final    Chloride 04/07/2022 106  95 - 110 mmol/L Final    CO2 04/07/2022 22 (A) 23 - 29 mmol/L Final    Glucose 04/07/2022 148 (A) 70 - 110 mg/dL Final    BUN 04/07/2022 9  6 - 20 mg/dL Final    Creatinine 04/07/2022 0.7  0.5 - 1.4 mg/dL Final    Calcium 04/07/2022 9.3  8.7 - 10.5 mg/dL Final    Total Protein 04/07/2022 7.2  6.0 - 8.4 g/dL Final    Albumin 04/07/2022 4.0  3.5 - 5.2 g/dL Final    Total Bilirubin 04/07/2022 0.5  0.1 - 1.0 mg/dL Final    Alkaline Phosphatase 04/07/2022 85  55 - 135 U/L Final    AST 04/07/2022 14  10 - 40 U/L Final    ALT 04/07/2022 19  10 - 44 U/L Final    Anion Gap 04/07/2022 12  8 - 16 mmol/L Final    eGFR if African American 04/07/2022 >60.0  >60 mL/min/1.73 m^2 Final    eGFR if non African American 04/07/2022 >60.0  >60 mL/min/1.73 m^2 Final    Hemoglobin A1C 04/07/2022 6.1 (A) 4.0 - 5.6 % Final    Estimated Avg Glucose 04/07/2022 128  68 - 131 mg/dL Final    TSH 04/07/2022 0.277 (A) 0.400 - 4.000 uIU/mL Final    Free T4 04/07/2022 0.91  0.71 - 1.51 ng/dL Final     Results for orders placed or performed during the hospital encounter of 10/04/14   CT Head Without Contrast     Narrative    Technique: Axial images obtained of brain without contrast    Clinical history: Numbness    Findings:    No comparison.    Mild prominence of bifrontal cortical sulci noted suggesting mild bifrontal atrophy.  No intra-cranial space-occupying mass, mass effect or focal parenchymal abnormality is seen.    Impression    Mild bifrontal atrophy with otherwise normal noncontrast CT appearance of the brain.    Dr. Orosco of the ER notified of the above findings 10/04/14 at 7:26 p.m..        Electronically signed by: Dr. Mile Tillman  Date:     10/04/14  Time:    19:27         Note: I have independently reviewed any/all imaging/labs/tests and agree with the report (s) as documented.  Any discrepancies will be as noted/demarcated by free text. OLGA RAMIREZ 8/17/2022  Assessment/Plan:     Problem List Items Addressed This Visit        Neuro    HA (headache)    Relevant Orders    Ambulatory referral/consult to Physical/Occupational Therapy    Migraine with aura and without status migrainosus, not intractable - Primary    Relevant Medications    ubrogepant (UBRELVY) 50 mg tablet    galcanezumab-gnlm 120 mg/mL PnIj      Other Visit Diagnoses     Anxiety        Relevant Orders    Ambulatory referral/consult to Psychiatry          This is a 47 y.o. female who presents for evaluation of complex multifactorial headaches, namely chronic migraine headaches refractory to multiple previous medication due to severe side effects/ reactions to medications and complicated by cervicogenic headache component .Discussed treatment options and patient agreed with the following plan:   I have placed  a referral to psychiatry for further evaluation for hx of anxiety and further medication management and to complete necessary refills of previous prescriptions of medications,used for tx of anxiety.  Preventative medication -trial of emgality injections once monthly for preventive tx of migraine headaches. It is medically necessary  patient be given emgality for preventive tx of frequent migraine headaches as patient has tried and failed multiple previous medication and experienced severe side effects/ reactions to these  Medications including Pamelor, Topamax-, venlafaxine, Zofran, metoprolol, prochlorperazine, and sumatriptan. I have given patient instructions of dosing of emgality injections and will send in prescription of emgality loading dose and emgality maintenance dose to patients preferred pharmacy with prior authorization.  We discussed side effects of the medications.  Abortive medication- trial of ubrelvy 50 mg as needed for abortive tx of migraine headaches. Ubrelvy is medically necessary for this patient as she  tried and failed multiple previous medication and experienced severe side effects/ reactions to these  Medications including Pamelor, Topamax-, venlafaxine, Zofran, metoprolol, prochlorperazine, and sumatriptan, and given her hx of hemiplegic migraine symptoms all other tritpans are contraindicated for use. I will send prescription of ubrelvy along with prior authorization to patients pharmacy.  Will place referral to physical therapy to address patients cervicogenic headache component.    Instructed patient  to start tracking headaches. Discussed the importance of practicing good sleep hygiene, minimizing stress/ stress coping techniques, healthy diet, and minimizing caffeine intake to a maximum of 100-200 mg /day.     RTC 3 months    I have discussed the risks and benefits of medication with the patient.  I have discussed realistic goals of care with patient at length as well as medication options, and need for lifestyle adjustment. I have explained that treatment will take time. We have agreed that the goal will be to reduce frequency/intensity/quantity of HA, not to be completely HA free. Patient agreeable to work on lifestyle adjustments.  The patient verbalizes understanding and agreement with the treatment plan.  Questions were sought and answered to patients stated verbal satisfaction.     Chilo Goodson PA-C  [facility]  Department of Neurology     Collaborating Physician, Dr. Bowman, was available during today's encounter.

## 2022-08-12 ENCOUNTER — TELEPHONE (OUTPATIENT)
Dept: PHARMACY | Facility: CLINIC | Age: 47
End: 2022-08-12
Payer: MEDICAID

## 2022-08-17 ENCOUNTER — TELEPHONE (OUTPATIENT)
Dept: NEUROLOGY | Facility: CLINIC | Age: 47
End: 2022-08-17
Payer: MEDICAID

## 2022-08-17 NOTE — TELEPHONE ENCOUNTER
----- Message from Matthew Crum sent at 8/17/2022  9:04 AM CDT -----  Regarding: order  Contact: @ [phone]  Patient calling to get the PT order faxed to: [facility] @ [phone]

## 2022-10-05 ENCOUNTER — TELEPHONE (OUTPATIENT)
Dept: NEUROLOGY | Facility: CLINIC | Age: 47
End: 2022-10-05
Payer: MEDICAID

## 2022-10-05 NOTE — TELEPHONE ENCOUNTER
----- Message from Emily Phipps sent at 10/5/2022 10:14 AM CDT -----  Regarding: PT req return to schedule for 3 mo f/u  Contact: PT  [phone]  PT req return to schedule for 3 mo f/u    Please call to schedule

## 2022-10-17 ENCOUNTER — LAB VISIT (OUTPATIENT)
Dept: LAB | Facility: HOSPITAL | Age: 47
End: 2022-10-17
Attending: NURSE PRACTITIONER
Payer: MEDICAID

## 2022-10-17 DIAGNOSIS — D72.829 LEUKOCYTOSIS, UNSPECIFIED TYPE: ICD-10-CM

## 2022-10-17 LAB
BASOPHILS # BLD AUTO: 0.05 K/UL (ref 0–0.2)
BASOPHILS NFR BLD: 0.4 % (ref 0–1.9)
DIFFERENTIAL METHOD: ABNORMAL
EOSINOPHIL # BLD AUTO: 0.2 K/UL (ref 0–0.5)
EOSINOPHIL NFR BLD: 1.5 % (ref 0–8)
ERYTHROCYTE [DISTWIDTH] IN BLOOD BY AUTOMATED COUNT: 11.9 % (ref 11.5–14.5)
HCT VFR BLD AUTO: 41.8 % (ref 37–48.5)
HGB BLD-MCNC: 14 G/DL (ref 12–16)
IMM GRANULOCYTES # BLD AUTO: 0.08 K/UL (ref 0–0.04)
IMM GRANULOCYTES NFR BLD AUTO: 0.6 % (ref 0–0.5)
LYMPHOCYTES # BLD AUTO: 4 K/UL (ref 1–4.8)
LYMPHOCYTES NFR BLD: 31.3 % (ref 18–48)
MCH RBC QN AUTO: 29.9 PG (ref 27–31)
MCHC RBC AUTO-ENTMCNC: 33.5 G/DL (ref 32–36)
MCV RBC AUTO: 89 FL (ref 82–98)
MONOCYTES # BLD AUTO: 0.8 K/UL (ref 0.3–1)
MONOCYTES NFR BLD: 6.1 % (ref 4–15)
NEUTROPHILS # BLD AUTO: 7.6 K/UL (ref 1.8–7.7)
NEUTROPHILS NFR BLD: 60.1 % (ref 38–73)
NRBC BLD-RTO: 0 /100 WBC
PLATELET # BLD AUTO: 288 K/UL (ref 150–450)
PMV BLD AUTO: 10.2 FL (ref 9.2–12.9)
RBC # BLD AUTO: 4.68 M/UL (ref 4–5.4)
WBC # BLD AUTO: 12.71 K/UL (ref 3.9–12.7)

## 2022-10-17 PROCEDURE — 85025 COMPLETE CBC W/AUTO DIFF WBC: CPT | Performed by: NURSE PRACTITIONER

## 2022-10-17 PROCEDURE — 36415 COLL VENOUS BLD VENIPUNCTURE: CPT | Performed by: NURSE PRACTITIONER

## 2022-10-18 LAB — PATH REV BLD -IMP: NORMAL

## 2022-10-31 DIAGNOSIS — G43.109 MIGRAINE WITH AURA AND WITHOUT STATUS MIGRAINOSUS, NOT INTRACTABLE: ICD-10-CM

## 2022-10-31 RX ORDER — UBROGEPANT 50 MG/1
TABLET ORAL
Qty: 10 TABLET | Refills: 2 | Status: CANCELLED | OUTPATIENT
Start: 2022-10-31

## 2023-01-04 ENCOUNTER — TELEPHONE (OUTPATIENT)
Dept: NEUROLOGY | Facility: CLINIC | Age: 48
End: 2023-01-04
Payer: MEDICAID

## 2023-01-04 NOTE — TELEPHONE ENCOUNTER
----- Message from Anupama Love sent at 1/4/2023  3:51 PM CST -----  Regarding: refill  Contact: @ [phone]  Pt is calling to get a refill on the following ubrogepant (UBRELVY) 50 mg tablet please call and adv @ [phone]

## 2023-01-31 ENCOUNTER — HOSPITAL ENCOUNTER (OUTPATIENT)
Dept: RADIOLOGY | Facility: HOSPITAL | Age: 48
Discharge: HOME | End: 2023-01-31
Attending: NURSE PRACTITIONER
Payer: MEDICAID

## 2023-01-31 VITALS — WEIGHT: 181 LBS | HEIGHT: 62 IN | BODY MASS INDEX: 33.31 KG/M2

## 2023-01-31 DIAGNOSIS — Z12.31 ENCOUNTER FOR SCREENING MAMMOGRAM FOR MALIGNANT NEOPLASM OF BREAST: ICD-10-CM

## 2023-01-31 PROCEDURE — 77063 MAMMO DIGITAL SCREENING BILAT WITH TOMO: ICD-10-PCS | Mod: 26,,, | Performed by: RADIOLOGY

## 2023-01-31 PROCEDURE — 77067 SCR MAMMO BI INCL CAD: CPT | Mod: 26,,, | Performed by: RADIOLOGY

## 2023-01-31 PROCEDURE — 77067 MAMMO DIGITAL SCREENING BILAT WITH TOMO: ICD-10-PCS | Mod: 26,,, | Performed by: RADIOLOGY

## 2023-01-31 PROCEDURE — 77067 SCR MAMMO BI INCL CAD: CPT | Mod: TC

## 2023-01-31 PROCEDURE — 77063 BREAST TOMOSYNTHESIS BI: CPT | Mod: 26,,, | Performed by: RADIOLOGY

## 2023-03-31 ENCOUNTER — OFFICE VISIT (OUTPATIENT)
Dept: NEUROLOGY | Facility: CLINIC | Age: 48
End: 2023-03-31
Payer: MEDICAID

## 2023-03-31 VITALS
HEART RATE: 97 BPM | SYSTOLIC BLOOD PRESSURE: 115 MMHG | DIASTOLIC BLOOD PRESSURE: 76 MMHG | WEIGHT: 181 LBS | BODY MASS INDEX: 33.31 KG/M2 | HEIGHT: 62 IN

## 2023-03-31 DIAGNOSIS — G43.109 MIGRAINE WITH AURA AND WITHOUT STATUS MIGRAINOSUS, NOT INTRACTABLE: ICD-10-CM

## 2023-03-31 DIAGNOSIS — G44.86 CERVICOGENIC HEADACHE: ICD-10-CM

## 2023-03-31 DIAGNOSIS — M79.2 NEUROPATHIC PAIN: Primary | ICD-10-CM

## 2023-03-31 PROCEDURE — 99999 PR PBB SHADOW E&M-EST. PATIENT-LVL III: ICD-10-PCS | Mod: PBBFAC,,, | Performed by: PSYCHIATRY & NEUROLOGY

## 2023-03-31 PROCEDURE — 3044F PR MOST RECENT HEMOGLOBIN A1C LEVEL <7.0%: ICD-10-PCS | Mod: CPTII,,, | Performed by: PSYCHIATRY & NEUROLOGY

## 2023-03-31 PROCEDURE — 3008F BODY MASS INDEX DOCD: CPT | Mod: CPTII,,, | Performed by: PSYCHIATRY & NEUROLOGY

## 2023-03-31 PROCEDURE — 3061F PR NEG MICROALBUMINURIA RESULT DOCUMENTED/REVIEW: ICD-10-PCS | Mod: CPTII,,, | Performed by: PSYCHIATRY & NEUROLOGY

## 2023-03-31 PROCEDURE — 1160F RVW MEDS BY RX/DR IN RCRD: CPT | Mod: CPTII,,, | Performed by: PSYCHIATRY & NEUROLOGY

## 2023-03-31 PROCEDURE — 1160F PR REVIEW ALL MEDS BY PRESCRIBER/CLIN PHARMACIST DOCUMENTED: ICD-10-PCS | Mod: CPTII,,, | Performed by: PSYCHIATRY & NEUROLOGY

## 2023-03-31 PROCEDURE — 99999 PR PBB SHADOW E&M-EST. PATIENT-LVL III: CPT | Mod: PBBFAC,,, | Performed by: PSYCHIATRY & NEUROLOGY

## 2023-03-31 PROCEDURE — 3078F DIAST BP <80 MM HG: CPT | Mod: CPTII,,, | Performed by: PSYCHIATRY & NEUROLOGY

## 2023-03-31 PROCEDURE — 3074F SYST BP LT 130 MM HG: CPT | Mod: CPTII,,, | Performed by: PSYCHIATRY & NEUROLOGY

## 2023-03-31 PROCEDURE — 99215 OFFICE O/P EST HI 40 MIN: CPT | Mod: S$PBB,,, | Performed by: PSYCHIATRY & NEUROLOGY

## 2023-03-31 PROCEDURE — 3078F PR MOST RECENT DIASTOLIC BLOOD PRESSURE < 80 MM HG: ICD-10-PCS | Mod: CPTII,,, | Performed by: PSYCHIATRY & NEUROLOGY

## 2023-03-31 PROCEDURE — 99215 PR OFFICE/OUTPT VISIT, EST, LEVL V, 40-54 MIN: ICD-10-PCS | Mod: S$PBB,,, | Performed by: PSYCHIATRY & NEUROLOGY

## 2023-03-31 PROCEDURE — 1159F MED LIST DOCD IN RCRD: CPT | Mod: CPTII,,, | Performed by: PSYCHIATRY & NEUROLOGY

## 2023-03-31 PROCEDURE — 3061F NEG MICROALBUMINURIA REV: CPT | Mod: CPTII,,, | Performed by: PSYCHIATRY & NEUROLOGY

## 2023-03-31 PROCEDURE — 3044F HG A1C LEVEL LT 7.0%: CPT | Mod: CPTII,,, | Performed by: PSYCHIATRY & NEUROLOGY

## 2023-03-31 PROCEDURE — 3066F NEPHROPATHY DOC TX: CPT | Mod: CPTII,,, | Performed by: PSYCHIATRY & NEUROLOGY

## 2023-03-31 PROCEDURE — 3074F PR MOST RECENT SYSTOLIC BLOOD PRESSURE < 130 MM HG: ICD-10-PCS | Mod: CPTII,,, | Performed by: PSYCHIATRY & NEUROLOGY

## 2023-03-31 PROCEDURE — 3008F PR BODY MASS INDEX (BMI) DOCUMENTED: ICD-10-PCS | Mod: CPTII,,, | Performed by: PSYCHIATRY & NEUROLOGY

## 2023-03-31 PROCEDURE — 3066F PR DOCUMENTATION OF TREATMENT FOR NEPHROPATHY: ICD-10-PCS | Mod: CPTII,,, | Performed by: PSYCHIATRY & NEUROLOGY

## 2023-03-31 PROCEDURE — 4010F ACE/ARB THERAPY RXD/TAKEN: CPT | Mod: CPTII,,, | Performed by: PSYCHIATRY & NEUROLOGY

## 2023-03-31 PROCEDURE — 99213 OFFICE O/P EST LOW 20 MIN: CPT | Mod: PBBFAC | Performed by: PSYCHIATRY & NEUROLOGY

## 2023-03-31 PROCEDURE — 1159F PR MEDICATION LIST DOCUMENTED IN MEDICAL RECORD: ICD-10-PCS | Mod: CPTII,,, | Performed by: PSYCHIATRY & NEUROLOGY

## 2023-03-31 PROCEDURE — 4010F PR ACE/ARB THEARPY RXD/TAKEN: ICD-10-PCS | Mod: CPTII,,, | Performed by: PSYCHIATRY & NEUROLOGY

## 2023-03-31 RX ORDER — UBROGEPANT 100 MG/1
100 TABLET ORAL ONCE AS NEEDED
Qty: 16 TABLET | Refills: 11 | Status: SHIPPED | OUTPATIENT
Start: 2023-03-31 | End: 2023-11-28

## 2023-03-31 RX ORDER — GABAPENTIN 300 MG/1
300 CAPSULE ORAL 3 TIMES DAILY PRN
Qty: 90 CAPSULE | Refills: 11 | Status: SHIPPED | OUTPATIENT
Start: 2023-03-31 | End: 2024-03-30

## 2023-03-31 NOTE — PROGRESS NOTES
"[facility]    CHIEF COMPLAINT?   Ms. Mili Marsh chief concern is complicated migraine.     Name of the referring physician No ref. provider found     Name of primary care provider Avelina Luna MD      Subjective:    HPI:  Ms. Mili Marsh presents today to discuss their complicated migraine history.    As this patient has been evaluated by [facility] the past, documentation from 08/11/2022 visit with HOMERO Goodson PA-C was reviewed at length and endorsed as accurate by the patient:  ====================================  This is a 47 y.o. female  who presents to clinic with her  for evaluation of headaches.   Patient notes she has suffered from migraine headaches and tension type headaches since she was in her early 20's. She further notes in 2010 she was diagnosed with "stroke-like/hemiplegic migraines" which she describes these headaches are prcoeeeded by an aura of nausea, and then the the left side of her face pulls to left (same side as headache) and sometimes is associated with left sided facial/lip numbness and these symptoms are then followed by a severe headache pain that occurs in back of her head and left parietal region and describes it feels like she can feel the  Blood in her head She notes she had previously seen neurology ADELIA Galindo last visit in 2016 and has tried and failed many medications aimed at treating her migraine headaches and notes she has had multiple allergic reactions and is quite sensitive to medications. She notes topamax- caused her to lose her memory and speech issues, pamelor caused hallucination and most recently tried medication, venlafaxine, which made her anxiety symptoms much worse and she felt heart palps.  She notes her migraines were previously managed by her PCP and controlled with xanax and compazine as needed but she futher notes she recently changed to a different PCP who will only prescribe her 90 " "tablets of xanax.  and notes this is the only treatment that helped relief her migraine and hemiplegic migraine aura symptoms. She also describes she experiences tension type headaches that occur in her neck bilateraly and radiate toward her the backs of both of her ears. She also notes in march of last year she was diagnosed with tennis elbow in her right arm and she tried wearing a brace and notes this made her right arm pain even worse and pain traveled into right shoulder/ into the right side of her neck and made both of her headaches worse.  Headache history:   Location - she describes her headaches occur in back of her head and left parietal region and describes it feels like she can feel the  Blood in her head. She also describes she experiences tension type ehadaches that occur in her neck bilateraly and radiate toward her the backs of both of her ears.   Duration - wakes up in the morning and will last all day.   Range of intensity - 10 /10  Frequency -  Almost daily   Time of day of most headaches- anytime  Prodrome/Aura - yes sometimes will experience nausea, and numbness of her lips and the left side of her face will pull and she will begin to   Triggers - none identified  Aggravating/Alleviating Factors - xanax and compazine in combination and notes this is the only treatment that helped relief her migraine and hemiplegic migraine aura symptoms  Sleep - no resolution of headache with sleep  Family history of headaches- yes her sister and her dad experience migraine headaches but neither of them expierence hemiplegic migraines "  ====================================    At their previous encounter, the patient was started on Emgality 120 mg monthly and Ubrelvy 50 mg p.r.n. for acute migraine.  The patient describes a fantastic response to this medication regimen.  By the 4th round of injections, she had eliminated total and severe headache days every month.  This trend continued until recently when she " "was unable to obtain her shot in a timely fashion.  During the brief time off shots, she experienced approximally 9 severe headache episodes each of which was successfully aborted by Ubrelvy.  This response of course represents a greater than 50% improvement from baseline.    Also discussed at previous encounters, the patient has a history of increased tension and pain in the cervical and shoulder regions.  Did physical therapy in the past successfully.  Also complains of intermittent soreness and tenderness in the musculature around the elbow as well as in the musculature of the hands, worse on the right.  No use of braces or other therapies at this time.  Feels as if there has been more physical labor at her job recently resulting in more symptoms.      PAST MEDICAL HISTORY:  Past Medical History:   Diagnosis Date    Anxiety     Asthma     Bronchitis     Diabetes mellitus     History of peptic ulcer disease     Hx of staphylococcal infection     Hypercholesteremia     Hypertension     Migraine headache     Pneumonia     Thyroid disease        PAST SURGICAL HISTORY:  Past Surgical History:   Procedure Laterality Date    LAPAROSCOPIC SALPINGO-OOPHORECTOMY Left 04/27/2021    Procedure: SALPINGO-OOPHORECTOMY, LAPAROSCOPIC;  Surgeon: Elder Wagner MD;  Location: [facility];  Service: OB/GYN;  Laterality: Left;       CURRENT MEDS:  Current Outpatient Medications   Medication Sig Dispense Refill    albuterol (PROVENTIL/VENTOLIN HFA) 90 mcg/actuation inhaler albuterol sulfate HFA 90 mcg/actuation aerosol inhaler   INHALE 2 PUFFS BY MOUTH EVERY 4 HOURS AS NEEDED.      alprazolam (XANAX) 1 MG tablet Take 1 mg by mouth 3 (three) times daily as needed for Anxiety.      aspirin (ECOTRIN) 81 MG EC tablet Take 162 mg by mouth once daily. 2 tabs daily      BD ULTRA-FINE MINI PEN NEEDLE 31 gauge x 3/16" Ndle once daily. Use      cyclobenzaprine (FLEXERIL) 10 MG tablet Take 10 mg by mouth 3 (three) times daily as needed.      " "DEPO-SUBQ PROVERA 104 104 mg/0.65 mL injection Inject into the skin.      diclofenac sodium (VOLTAREN) 1 % Gel APPLY 2 GM (GRAMS) TOPICALLY TO THE AFFECTED JOINTS 4 TIMES DAILY      galcanezumab-gnlm 120 mg/mL PnIj Inject 120 mg into the skin every 28 days. 1 mL 6    insulin detemir U-100 (LEVEMIR FLEXPEN) 100 unit/mL (3 mL) InPn pen Inject 55 Units into the skin every evening. 16.5 mL 11    liraglutide 0.6 mg/0.1 mL, 18 mg/3 mL, subq PNIJ (VICTOZA 3-GARIMA) 0.6 mg/0.1 mL (18 mg/3 mL) PnIj pen Inject 1.8 mg into the skin once daily. 9 mL 11    methIMAzole (TAPAZOLE) 5 MG Tab Take 1 tablet (5 mg total) by mouth once daily. 30 tablet 5    metoprolol tartrate (LOPRESSOR) 50 MG tablet Take 50 mg by mouth 2 (two) times daily.       mupirocin (BACTROBAN) 2 % ointment Apply topically 2 (two) times daily. Apply to affected area of skin and nasally with Q-tip for 10 days (Patient taking differently: Apply topically 2 (two) times daily as needed. Apply to affected area of skin and nasally with Q-tip for 10 days) 30 g 1    naproxen (NAPROSYN) 500 MG tablet TAKE 1 TABLET BY MOUTH 2 TIMES DAILY AS NEEDED. TAKE WITH FOOD.      pen needle, diabetic (BD ULTRA-FINE SHORT PEN NEEDLE) 31 gauge x 5/16" Ndle USE 1 TO INJECT INTO THE SKIN 4 TIMES DAILY WITH MEALS AND NIGHTLY. Dx Code E11.65, Z79.4 200 each 11    prochlorperazine (COMPAZINE) 10 MG tablet Take by mouth. Take one tablet at onset of headache and then one tab q 6 hours as needed      ramipriL (ALTACE) 5 MG capsule TAKE 1 CAPSULE BY MOUTH ONCE DAILY. 90 capsule 3    ubrogepant (UBRELVY) 50 mg tablet Take 1 (50 mg) tablet by mouth at onset of headache; if needed a second dose may be taken at least 2 hours after the initial dose; MAX dose 200 mg/24 hr 10 tablet 2    vitamin D (VITAMIN D3) 1000 units Tab Take 2 tablets (2,000 Units total) by mouth once daily. 60 tablet 11     No current facility-administered medications for this visit.       ALLERGIES:  Review of patient's allergies " indicates:   Allergen Reactions    Adhesive tape-silicones      Can use paper tape    Crestor [rosuvastatin] Other (See Comments)     Fluid overload    Lipitor [atorvastatin] Other (See Comments)     Muscle spasm    Lorazepam Hallucinations    Metformin Other (See Comments)     Abdominal pain- can take metformin er    Non-drowsy allergy [loratadine] Hallucinations     Throat begins to close    Can not take any labelled non drowsy medication    Pamelor [nortriptyline] Other (See Comments)     Confusion    Penicillins Other (See Comments)     bleeding    Statins-hmg-coa reductase inhibitors     Topamax [topiramate] Other (See Comments)     Forget things, anxiety, withdrawn symptoms.    [id number] antimigraine agents Hallucinations     Memory issues- like own name, spelling    Wellbutrin [bupropion hcl]     Farxiga [dapagliflozin] Rash     Candidiasis     Pravastatin Nausea Only     muscle weakness        FAMILY HISTORY:  Family History   Problem Relation Age of Onset    Heart failure Mother     Diabetes Mother     Hypertension Mother     Hypertension Father     Cardiomyopathy Father     Diabetes Sister     Ulcerative colitis Sister     Diabetes Sister     Hyperlipidemia Sister     Thyroid disease Sister     Alopecia Sister     Hypertension Brother     Diabetes Brother     Hypertension Brother     Breast cancer Maternal Aunt     Breast cancer Cousin     Colon cancer Cousin        SOCIAL HISTORY:  Social History     Tobacco Use    Smoking status: Every Day     Packs/day: 1.00     Years: 30.00     Pack years: 30.00     Types: Cigarettes    Smokeless tobacco: Never   Substance Use Topics    Alcohol use: No     Alcohol/week: 0.0 standard drinks    Drug use: No       Review of Systems:  Gen: no fever, no chills, no generalized feeling of weakness   HEENT: no double vision, no blurred vision, no eye pain, no eye exudates.    Heart: no chest pain, no SOB    Lungs: no SOB, no cough    MSK: no weakness of legs, intact ROM   "  ABD: no abd pain, no N/V/D/C, no difficulty with defecation .    Extremities: No leg pain, no edema.    Objective:  PHYSICAL EXAMINATION??   Mili Marsh is a 48 y.o. female patient who has the following vital signs with   Vitals:    03/31/23 1442   BP: 115/76   BP Location: Right arm   Patient Position: Sitting   Pulse: 97   Weight: 82.1 kg (181 lb)   Height: 5' 2" (1.575 m)   , body surface area is 1.9 meters squared.     General: female in NAD, alert and awake, Aox3, well groomed. ?    ? ?    HEENT: Head is NC/AT EOMI, pupil size: 4 mm B/L, no nystagmus noted; hearing grossly intact b/l. Mucous membrane moist, uvula midline, no pharyngeal erythema,exudates or discharges.      Neck: Supple. no nuchal rigidity.      Cardiovascular: well perfused, no cyanosis        Respiratory: Symmetric chest rise noted       Musculoskeletal: Muscle tone noted to be adequate for patient age, muscle mass is WNL. No spontaneous movements or fasciculations noted during this examination.       Extremities: No pedal edema or calf tenderness. No cogwheel rigidity noted on B/L UE extremities.      Tenderness to palpation around the inferior anterior surface of the elbow.  Positive Tinel sign right elbow.  Positive Tinel sign bilateral wrist.     Neurological Examination.    Mental status: AA&O x3; Affect/mood is euthymic/congruent; no aphasia noted during examination. Patient answers simple questions appropriately & follows simple commands; no dysarthria or expressive aphasia; no elizabeth-neglect or extinction. Vocabulary/word finding: excellent.       Cranial Nerves: II-XII grossly intact bilaterally.     Muscle Function: Tone WNL and Muscle bulk WNL. Full and symmetric strength, 5/5, throughout.      Sensory: ?light touch is grossly intact in bilateral upper and lower extremities and face.     Reflexes: Left and Right biceps, triceps, brachioradialis are 2+/4. patellar 2+/4 on Left and 2+/4 on Right     Coordination: no dysmetria " (finger to nose negative)     Gait: adequate casual gait with stride length and arm swing WNL. Tandem gait and walking on toes and heels are WNL.    REVIEW   We reviewed their current medical history, medications, allergies, past medical history, surgical history, social history, family history, and review of systems, and updated all the information.     ASSESSMENT AND PLAN   Elma is a very pleasant, 48 y.o. with complicated diagnosis of     1. Neuropathic pain  gabapentin (NEURONTIN) 300 MG capsule      2. Migraine with aura and without status migrainosus, not intractable  galcanezumab-gnlm 120 mg/mL PnIj    ubrogepant (UBRELVY) 100 mg tablet    gabapentin (NEURONTIN) 300 MG capsule      3. Cervicogenic headache            Imli has headaches that previously med International Headache Society criteria for chronic migraine with aura.  Her headache history is complex and involves multiple previous medication failures due to severe side effects and reactions.  Her case is further complicated by cervicogenic headaches.  Medication overuse is not likely contributing to this patient's headaches.     Patient feels not only severe disabling pain, but is also unable to function due to headaches when they are present.     My approach to every patient is multimodal.   I focused our discussion on addressing modifiable risk factors and trying to decrease them. After discussion with the patient, I recommend the followin. Preventive medications:    Continue:  Emgality 120 mg monthly   This medication has provided greater than 50% decrease in total and severe headache days over previous baseline.    Given her excellent response to this medicine, it is medically necessary to continue given her failures of Pamelor, Topamax-, venlafaxine, Zofran, metoprolol, prochlorperazine, and sumatriptan.     2. Acute (abortive) medications:    Increase:  Ubrelvy to 100 mg tablets     Ubrelvy is medically necessary for this patient given  her failure of multiple previous medication and experienced severe side effects/ reactions to these medications including sumatriptan, Zofran, prochlorperazine. Given her hx of hemiplegic migraine symptoms all other tritpans are contraindicated for use.    3. Natural approaches to increasing brain energy and serotonin:    SAMe 200 mg a day    Vitamin B2 400 mg daily    Vitamin B12 1000 mcg daily    Getting early morning light to increase natural serotonin, melatonin. Could also consider light therapy box, 10,000 LUX.     4. Headache prevention and acute treatment over-the counter supplements    Boswellia 800 mg 2-3 times per day, example brand Donald's, natural anti inflammatory herb    Sleep? modulation- melatonin at night 5 to 10 mg 30 minutes prior to sleep    Feverfew Tea 1-3 cups per day. Can get from [employer] or [email or web address]- A Natural anti inflammatory approach that does not cause further sensitization of the system.    Magnesium Citrate 250 mg daily, slowly increase up to 500-1000 mg daily. Side effect may include diarrhea, so we recommend stopping at whatever dose is comfortable for your body without causing this side effect. This supplement can be very helpful for preventing headache, preventing migraine aura, calming nerves, muscles and even mood. Recommend starting slowly, as it can also lead to increased bowel movements or diarrhea.?     5. DIET: I recommend a diet that heavily favors fruits and vegetables while reducing carbs. More information is available upon request.     6. EXERCISE: Gentle movement exercises, concentrate on combination of muscle building and aerobic. I also recommend adding gentle Yoga therapy. The goal is 150 minutes per week of moderate to rigorous exercise, but you should start at your own pace and progress slowly and safely as discussed today.    7. ANXIETY/STRESS- Control stressors with yoga, meditation, counseling, or other methods of mindfulness.     8. SLEEP- aim  for 7-8 hrs of restful sleep per night.     9. Given described neuropathic pain in the bilateral upper extremities, we discussed benefits, risks, side effects, alternatives and agreed to proceed with a trial of gabapentin 300 mg up to 3 times daily as needed.  Patient was advised to use bilateral wrist braces when sleeping.  If symptoms continue, can revisit physical therapy or consult Orthopedics for possible steroid injections.    Benefits, side effects, and alternatives were discussed extensively with the patient.?     Mili verbally endorsed understanding of all the recommendations.?     she is going to follow up with our clinic in 6 months or sooner as needed?     I spent a total of 40 minutes on the day of the visit. This includes face to face time and non-face to face time preparing to see the patient (eg, review of tests), obtaining and/or reviewing separately obtained history, documenting clinical information in the electronic or other health record, independently interpreting results and communicating results to the patient/family/caregiver, or care coordinator.    A dictation device was used to produce this documentation which can sometimes result in grammatical errors or the replacement of similar sounding words.    Isaac Quintana, DO  Headache Medicine

## 2023-08-29 PROBLEM — J43.9 PULMONARY EMPHYSEMA: Status: ACTIVE | Noted: 2023-08-29

## 2024-01-05 ENCOUNTER — PATIENT MESSAGE (OUTPATIENT)
Dept: NEUROLOGY | Facility: CLINIC | Age: 49
End: 2024-01-05
Payer: MEDICAID

## 2024-02-08 ENCOUNTER — HOSPITAL ENCOUNTER (OUTPATIENT)
Dept: RADIOLOGY | Facility: HOSPITAL | Age: 49
Discharge: HOME | End: 2024-02-08
Attending: NURSE PRACTITIONER
Payer: MEDICAID

## 2024-02-08 VITALS — BODY MASS INDEX: 33.86 KG/M2 | HEIGHT: 62 IN | WEIGHT: 184 LBS

## 2024-02-08 DIAGNOSIS — Z12.31 ENCOUNTER FOR SCREENING MAMMOGRAM FOR MALIGNANT NEOPLASM OF BREAST: ICD-10-CM

## 2024-02-08 PROCEDURE — 77063 BREAST TOMOSYNTHESIS BI: CPT | Mod: 26,,, | Performed by: RADIOLOGY

## 2024-02-08 PROCEDURE — 77067 SCR MAMMO BI INCL CAD: CPT | Mod: TC

## 2024-02-08 PROCEDURE — 77067 SCR MAMMO BI INCL CAD: CPT | Mod: 26,,, | Performed by: RADIOLOGY

## 2024-03-04 DIAGNOSIS — F17.200 NEEDS SMOKING CESSATION EDUCATION: ICD-10-CM

## 2024-09-25 ENCOUNTER — PATIENT MESSAGE (OUTPATIENT)
Dept: OTHER | Facility: OTHER | Age: 49
End: 2024-09-25
Payer: MEDICAID

## 2025-03-25 ENCOUNTER — HOSPITAL ENCOUNTER (OUTPATIENT)
Dept: RADIOLOGY | Facility: HOSPITAL | Age: 50
Discharge: HOME | End: 2025-03-25
Attending: NURSE PRACTITIONER
Payer: MEDICAID

## 2025-03-25 VITALS — BODY MASS INDEX: 30.73 KG/M2 | WEIGHT: 167 LBS | HEIGHT: 62 IN

## 2025-03-25 DIAGNOSIS — Z12.31 ENCOUNTER FOR SCREENING MAMMOGRAM FOR BREAST CANCER: ICD-10-CM

## 2025-03-25 PROCEDURE — 77067 SCR MAMMO BI INCL CAD: CPT | Mod: 26,,, | Performed by: RADIOLOGY

## 2025-03-25 PROCEDURE — 77063 BREAST TOMOSYNTHESIS BI: CPT | Mod: TC

## 2025-03-25 PROCEDURE — 77063 BREAST TOMOSYNTHESIS BI: CPT | Mod: 26,,, | Performed by: RADIOLOGY

## (undated) DEVICE — NDL INSUF ULTRA VERESS 120MM

## (undated) DEVICE — SOL NS 1000CC

## (undated) DEVICE — ADHESIVE DERMABOND ADVANCED

## (undated) DEVICE — TUBING LAPARSCOPIC INSUFFLATIN

## (undated) DEVICE — DISSECTOR SONICISION CRV 39CM

## (undated) DEVICE — TRAY FOLEY CATH 16FR LATEX FRE

## (undated) DEVICE — ELECTRODE REM PLYHSV RETURN 9

## (undated) DEVICE — EVACUATOR KIT SMOKE PLUME AWAY

## (undated) DEVICE — PACK LAPAROSCOPY

## (undated) DEVICE — SUT VICRYL CTD 3-0 PS-1 18

## (undated) DEVICE — SEE MEDLINE ITEM 157117

## (undated) DEVICE — IRRIGATOR ENDOSCOPY DISP.

## (undated) DEVICE — SUT CTD VICRYL 3-0 PS-1

## (undated) DEVICE — APPLICATOR CHLORAPREP ORN 26ML

## (undated) DEVICE — GLOVE PROTEXIS LTX MICRO  7.5

## (undated) DEVICE — PAD SANITARY OB STERILE

## (undated) DEVICE — TROCAR ENDO Z THREAD KII 5X100

## (undated) DEVICE — CANNULA LAP SEAL Z THRD 5X100

## (undated) DEVICE — BAG TISS RETRV MONARCH 10MM

## (undated) DEVICE — TROCAR KII FIOS 11MM X 100MM